# Patient Record
Sex: MALE | Race: WHITE | NOT HISPANIC OR LATINO | Employment: OTHER | URBAN - METROPOLITAN AREA
[De-identification: names, ages, dates, MRNs, and addresses within clinical notes are randomized per-mention and may not be internally consistent; named-entity substitution may affect disease eponyms.]

---

## 2022-09-27 ENCOUNTER — OFFICE VISIT (OUTPATIENT)
Dept: INTERNAL MEDICINE | Facility: CLINIC | Age: 67
End: 2022-09-27
Payer: MEDICARE

## 2022-09-27 VITALS
WEIGHT: 158.5 LBS | BODY MASS INDEX: 22.19 KG/M2 | OXYGEN SATURATION: 98 % | HEIGHT: 71 IN | SYSTOLIC BLOOD PRESSURE: 132 MMHG | DIASTOLIC BLOOD PRESSURE: 82 MMHG

## 2022-09-27 DIAGNOSIS — I10 PRIMARY HYPERTENSION: Primary | ICD-10-CM

## 2022-09-27 DIAGNOSIS — F31.9 BIPOLAR AFFECTIVE DISORDER, REMISSION STATUS UNSPECIFIED: ICD-10-CM

## 2022-09-27 DIAGNOSIS — Z12.5 PROSTATE CANCER SCREENING: ICD-10-CM

## 2022-09-27 DIAGNOSIS — I73.9 PAD (PERIPHERAL ARTERY DISEASE): ICD-10-CM

## 2022-09-27 DIAGNOSIS — Z87.39 HISTORY OF GOUT: ICD-10-CM

## 2022-09-27 DIAGNOSIS — E78.5 HYPERLIPIDEMIA, UNSPECIFIED HYPERLIPIDEMIA TYPE: ICD-10-CM

## 2022-09-27 DIAGNOSIS — F43.12 CHRONIC POST-TRAUMATIC STRESS DISORDER (PTSD): ICD-10-CM

## 2022-09-27 DIAGNOSIS — G30.1 LATE ONSET ALZHEIMER'S DEMENTIA WITHOUT BEHAVIORAL DISTURBANCE: ICD-10-CM

## 2022-09-27 DIAGNOSIS — F02.80 LATE ONSET ALZHEIMER'S DEMENTIA WITHOUT BEHAVIORAL DISTURBANCE: ICD-10-CM

## 2022-09-27 PROBLEM — G30.9 ALZHEIMER'S DEMENTIA: Status: ACTIVE | Noted: 2022-09-27

## 2022-09-27 PROCEDURE — 99203 OFFICE O/P NEW LOW 30 MIN: CPT | Mod: PBBFAC,PO | Performed by: INTERNAL MEDICINE

## 2022-09-27 PROCEDURE — 99205 OFFICE O/P NEW HI 60 MIN: CPT | Mod: S$PBB,,, | Performed by: INTERNAL MEDICINE

## 2022-09-27 PROCEDURE — 99999 PR PBB SHADOW E&M-NEW PATIENT-LVL III: ICD-10-PCS | Mod: PBBFAC,,, | Performed by: INTERNAL MEDICINE

## 2022-09-27 PROCEDURE — 99999 PR PBB SHADOW E&M-NEW PATIENT-LVL III: CPT | Mod: PBBFAC,,, | Performed by: INTERNAL MEDICINE

## 2022-09-27 PROCEDURE — 99205 PR OFFICE/OUTPT VISIT, NEW, LEVL V, 60-74 MIN: ICD-10-PCS | Mod: S$PBB,,, | Performed by: INTERNAL MEDICINE

## 2022-09-27 RX ORDER — ALLOPURINOL 100 MG/1
100 TABLET ORAL 3 TIMES DAILY
COMMUNITY

## 2022-09-27 RX ORDER — ASPIRIN 325 MG
325 TABLET ORAL DAILY
COMMUNITY

## 2022-09-27 RX ORDER — DIVALPROEX SODIUM 500 MG/1
250 TABLET, DELAYED RELEASE ORAL EVERY 8 HOURS
COMMUNITY

## 2022-09-27 RX ORDER — LANOLIN ALCOHOL/MO/W.PET/CERES
100 CREAM (GRAM) TOPICAL DAILY
COMMUNITY

## 2022-09-27 RX ORDER — LISINOPRIL 5 MG/1
TABLET ORAL DAILY
COMMUNITY

## 2022-09-27 RX ORDER — CLOPIDOGREL BISULFATE 75 MG/1
75 TABLET ORAL DAILY
COMMUNITY

## 2022-09-27 RX ORDER — QUETIAPINE FUMARATE 25 MG/1
25 TABLET, FILM COATED ORAL DAILY
COMMUNITY

## 2022-09-27 RX ORDER — QUETIAPINE FUMARATE 50 MG/1
75 TABLET, FILM COATED ORAL NIGHTLY
COMMUNITY

## 2022-09-27 RX ORDER — QUETIAPINE FUMARATE 100 MG/1
75 TABLET, FILM COATED ORAL
COMMUNITY
End: 2022-09-27 | Stop reason: CLARIF

## 2022-09-27 RX ORDER — MEMANTINE HYDROCHLORIDE 10 MG/1
10 TABLET ORAL 2 TIMES DAILY
Qty: 180 TABLET | Refills: 3
Start: 2022-09-27 | End: 2023-09-27

## 2022-09-27 RX ORDER — ATORVASTATIN CALCIUM 40 MG/1
40 TABLET, FILM COATED ORAL DAILY
COMMUNITY

## 2022-09-27 RX ORDER — DONEPEZIL HYDROCHLORIDE 10 MG/1
10 TABLET, FILM COATED ORAL NIGHTLY
COMMUNITY

## 2022-09-27 NOTE — PROGRESS NOTES
"HPI:  Patient is a 67-year-old man who comes in today to establish care.  He is accompanied by his wife and son.  Patient has had progressive Alzheimer's dementia and has been recently admitted to the dementia unit at the local assisted living facility.  He has moved here from [facility].  I have reviewed all the medical records they brought with him.  Using that we established a past medical history as outlined below the patient's cell is pretty much nonverbal and all the history was taken from the son wife and the records.  Patient does have behavioral changes with his Alzheimer's dementia.  He at times gets aggressive.  He has never hurt himself or anyone else.    Current MEDS: medcard review, verified and update  Allergies: Per the electronic medical record    Past Medical History:   Diagnosis Date    Alzheimer's dementia     Bipolar disorder     Chronic post-traumatic stress disorder (PTSD)     Depression     History of gout     Hyperlipidemia     Hypertension     Insomnia     PAD (peripheral artery disease) 2016    s/p stent RLE    Stroke        Past Surgical History:   Procedure Laterality Date    JOINT REPLACEMENT      SMALL INTESTINE SURGERY      stent Right        SHx: per the electronic medical record    FHx: recorded in the electronic medical record    ROS:    denies any chest pains or shortness of breath. Denies any nausea, vomiting or diarrhea. Denies any fever, chills or sweats. Denies any change in weight, voice, stool, skin or hair. Denies any dysuria, dyspepsia or dysphagia. Denies any change in vision, hearing or headaches. Denies any swollen lymph nodes or loss of memory.    PE:  /82 (BP Location: Right arm)   Ht 5' 11" (1.803 m)   Wt 71.9 kg (158 lb 8.2 oz)   SpO2 98%   BMI 22.11 kg/m²   Gen: Well-developed, well-nourished, male, in no acute distress, oriented x1  HEENT: neck is supple, no adenopathy, carotids 2+ equal without bruits, thyroid exam normal size without " nodules.  CHEST: clear to auscultation and percussion  CVS: regular rate and rhythm with 2-3/6 systolic murmur, no gallop or rubs  ABD: soft, benign, no rebound no guarding, no distention.  Bowel sounds are normal.     nontender.  No palpable masses.  No organomegaly and no audible bruits.  RECTAL:  Deferred  EXT: no clubbing, cyanosis, or edema  LYMPH: no cervical, inguinal, or axillary adenopathy  FEET: no loss of sensation.  No ulcers or pressure sores.  NEURO: gait normal.  Cranial nerves II- XII intact. No nystagmus.  Speech normal.   Gross motor and sensory unremarkable.        Impression:  Advanced dementia  Other medical problems as outlined below  Patient Active Problem List   Diagnosis    Hypertension    Bipolar disorder    Hyperlipidemia    Chronic post-traumatic stress disorder (PTSD)    History of gout    Alzheimer's dementia    PAD (peripheral artery disease)       Plan:   Orders Placed This Encounter    memantine (NAMENDA) 10 MG Tab     Patient will continue with his current medical therapy.  He had lab work done 1 month ago that was all normal.  I would like to see him again in 6 months or otherwise as needed.  This note is generated with speech recognition software and is subject to transcription error and sound alike phrases that may be missed by proofreading.

## 2022-09-28 DIAGNOSIS — I10 HYPERTENSION: ICD-10-CM

## 2022-10-03 ENCOUNTER — PATIENT MESSAGE (OUTPATIENT)
Dept: ADMINISTRATIVE | Facility: HOSPITAL | Age: 67
End: 2022-10-03
Payer: MEDICARE

## 2022-10-05 DIAGNOSIS — I10 HYPERTENSION: ICD-10-CM

## 2022-12-27 ENCOUNTER — TELEPHONE (OUTPATIENT)
Dept: INTERNAL MEDICINE | Facility: CLINIC | Age: 67
End: 2022-12-27

## 2022-12-27 ENCOUNTER — OFFICE VISIT (OUTPATIENT)
Dept: INTERNAL MEDICINE | Facility: CLINIC | Age: 67
End: 2022-12-27
Payer: MEDICARE

## 2022-12-27 VITALS
BODY MASS INDEX: 23.83 KG/M2 | DIASTOLIC BLOOD PRESSURE: 60 MMHG | WEIGHT: 166.44 LBS | HEART RATE: 64 BPM | HEIGHT: 70 IN | SYSTOLIC BLOOD PRESSURE: 110 MMHG | OXYGEN SATURATION: 97 % | RESPIRATION RATE: 20 BRPM | TEMPERATURE: 98 F

## 2022-12-27 DIAGNOSIS — I73.9 PAD (PERIPHERAL ARTERY DISEASE): ICD-10-CM

## 2022-12-27 DIAGNOSIS — F31.9 BIPOLAR AFFECTIVE DISORDER, REMISSION STATUS UNSPECIFIED: ICD-10-CM

## 2022-12-27 DIAGNOSIS — G30.1 SEVERE LATE ONSET ALZHEIMER'S DEMENTIA WITH AGITATION: Primary | ICD-10-CM

## 2022-12-27 DIAGNOSIS — E78.5 HYPERLIPIDEMIA, UNSPECIFIED HYPERLIPIDEMIA TYPE: ICD-10-CM

## 2022-12-27 DIAGNOSIS — F02.C11 SEVERE LATE ONSET ALZHEIMER'S DEMENTIA WITH AGITATION: Primary | ICD-10-CM

## 2022-12-27 DIAGNOSIS — I10 PRIMARY HYPERTENSION: ICD-10-CM

## 2022-12-27 PROCEDURE — 99214 PR OFFICE/OUTPT VISIT, EST, LEVL IV, 30-39 MIN: ICD-10-PCS | Mod: S$PBB,GW,, | Performed by: INTERNAL MEDICINE

## 2022-12-27 PROCEDURE — 99999 PR PBB SHADOW E&M-EST. PATIENT-LVL IV: CPT | Mod: PBBFAC,,, | Performed by: INTERNAL MEDICINE

## 2022-12-27 PROCEDURE — 99214 OFFICE O/P EST MOD 30 MIN: CPT | Mod: S$PBB,GW,, | Performed by: INTERNAL MEDICINE

## 2022-12-27 PROCEDURE — 99999 PR PBB SHADOW E&M-EST. PATIENT-LVL IV: ICD-10-PCS | Mod: PBBFAC,,, | Performed by: INTERNAL MEDICINE

## 2022-12-27 PROCEDURE — 99214 OFFICE O/P EST MOD 30 MIN: CPT | Mod: PBBFAC,PO | Performed by: INTERNAL MEDICINE

## 2022-12-27 NOTE — TELEPHONE ENCOUNTER
Randa with [employer] called and stated the son left you with paperwork that needs to be signed in front /or notorized.

## 2022-12-27 NOTE — PROGRESS NOTES
"HPI:  Patient is a 67-year-old man who is brought here by his son for evaluation to sign an affidavit stating that due to his advanced Alzheimer's disease that he is unable to 8 decisions for himself in regards to his person property or any other interest.  The introduction will make his wife be the person with power  over all his affairs.  I have only seen the patient once before.  He definitely has severe advanced Alzheimer's dementia.  Since last time I seen him he has been placed on hospice care.    Current meds have been verified and updated per the EMR  Exam:/60 (BP Location: Right arm, Patient Position: Sitting, BP Method: Large (Manual))   Pulse 64   Temp 97.9 °F (36.6 °C) (Temporal)   Resp 20   Ht 5' 10" (1.778 m)   Wt 75.5 kg (166 lb 7.2 oz)   SpO2 97%   BMI 23.88 kg/m²   Patient is oriented times 0  He has essentially no interaction with me unless directly asked and even then it is just minimal response.    No results found for: WBC, HGB, HCT, PLT, CHOL, TRIG, HDL, LDLDIRECT, ALT, AST, NA, K, CL, CREATININE, BUN, CO2, TSH, PSA, INR, GLUF, HGBA1C, MICROALBUR    Impression:  Severe, end-stage Alzheimer's dementia  Patient Active Problem List   Diagnosis    Hypertension    Bipolar disorder    Hyperlipidemia    Chronic post-traumatic stress disorder (PTSD)    History of gout    Alzheimer's dementia    PAD (peripheral artery disease)       Plan:     Patient most definitely meets the standard for and someone else make all of his financial and personal decisions.  I am very comfortable signing the affidavit provided by the      This note is generated with speech recognition software and is subject to transcription error and sound alike phrases that may be missed by proofreading.      "

## 2022-12-27 NOTE — TELEPHONE ENCOUNTER
----- Message from Delia Delgado sent at 12/27/2022 12:02 PM CST -----  Contact: Randa/ [employer]  Randa is calling to speak to the nurse regarding a signed  affidavit that was suppose to be signed today at the patient appointment. Please give her a call back at     Thanks   LJ

## 2022-12-27 NOTE — TELEPHONE ENCOUNTER
Call the 's office and tell them they need to provide a notary to come to my office and to bring a new affidavit for me to sign.

## 2023-04-19 ENCOUNTER — PATIENT MESSAGE (OUTPATIENT)
Dept: ADMINISTRATIVE | Facility: HOSPITAL | Age: 68
End: 2023-04-19
Payer: MEDICARE

## 2023-10-04 ENCOUNTER — PATIENT MESSAGE (OUTPATIENT)
Dept: ADMINISTRATIVE | Facility: HOSPITAL | Age: 68
End: 2023-10-04
Payer: MEDICARE

## 2023-11-22 DIAGNOSIS — I10 HYPERTENSION: ICD-10-CM
